# Patient Record
Sex: FEMALE
[De-identification: names, ages, dates, MRNs, and addresses within clinical notes are randomized per-mention and may not be internally consistent; named-entity substitution may affect disease eponyms.]

---

## 2023-10-18 ENCOUNTER — DASHBOARD ENCOUNTERS (OUTPATIENT)
Age: 28
End: 2023-10-18

## 2023-10-18 ENCOUNTER — OFFICE VISIT (OUTPATIENT)
Dept: URBAN - METROPOLITAN AREA CLINIC 115 | Facility: CLINIC | Age: 28
End: 2023-10-18
Payer: COMMERCIAL

## 2023-10-18 ENCOUNTER — LAB OUTSIDE AN ENCOUNTER (OUTPATIENT)
Dept: URBAN - METROPOLITAN AREA CLINIC 115 | Facility: CLINIC | Age: 28
End: 2023-10-18

## 2023-10-18 VITALS
BODY MASS INDEX: 33.63 KG/M2 | TEMPERATURE: 97.8 F | WEIGHT: 197 LBS | HEART RATE: 101 BPM | DIASTOLIC BLOOD PRESSURE: 78 MMHG | HEIGHT: 64 IN | SYSTOLIC BLOOD PRESSURE: 115 MMHG

## 2023-10-18 DIAGNOSIS — K21.9 GASTROESOPHAGEAL REFLUX DISEASE, UNSPECIFIED WHETHER ESOPHAGITIS PRESENT: ICD-10-CM

## 2023-10-18 DIAGNOSIS — R10.13 EPIGASTRIC ABDOMINAL PAIN: ICD-10-CM

## 2023-10-18 DIAGNOSIS — R11.0 NAUSEA: ICD-10-CM

## 2023-10-18 PROBLEM — 422587007: Status: ACTIVE | Noted: 2023-10-18

## 2023-10-18 PROBLEM — 235595009: Status: ACTIVE | Noted: 2023-10-18

## 2023-10-18 PROCEDURE — 99204 OFFICE O/P NEW MOD 45 MIN: CPT | Performed by: INTERNAL MEDICINE

## 2023-10-18 RX ORDER — DICYCLOMINE HYDROCHLORIDE 10 MG/1
TAKE 1 CAPSULE BY MOUTH THREE TIMES A DAY CAPSULE ORAL
Qty: 90 CAPSULE | Refills: 1 | OUTPATIENT
Start: 2023-10-18 | End: 2023-12-16

## 2023-10-18 RX ORDER — PANTOPRAZOLE SODIUM 40 MG/1
1 TABLET TABLET, DELAYED RELEASE ORAL ONCE A DAY
Qty: 30 TABLET | Refills: 1 | OUTPATIENT
Start: 2023-10-18

## 2023-10-18 RX ORDER — SUCRALFATE 1 G/10ML
10 ML 1 HOUR BEFORE MEALS AND AT BEDTIME ON AN EMPTY STOMACH SUSPENSION ORAL
Status: ACTIVE | COMMUNITY

## 2023-10-18 NOTE — HPI-TODAY'S VISIT:
Ms. Jaeger is a 28-year-old female came into the office with complaints of having epigastric abdominal pain as well as nausea and intermittent loose stools and cramping.  Patient stated she has had the symptoms going on off and on for several years she went to the emergency room at least on 3-4 different occasions she reports getting treated with GI cocktails does not remember having any other work-up done.  Patient reports having occasional reflux symptoms denies any anxiety.  She reports that during these episodes of nausea epigastric pain is predominant and sometimes radiates to the periumbilical region.  During this time she also have loose stools.  Her sister had gallbladder disease.  No family history of inflammatory bowel disease.  Her mother accompanied her and endorses a history.  Denies using any NSAIDs.

## 2023-10-19 ENCOUNTER — OFFICE VISIT (OUTPATIENT)
Dept: URBAN - METROPOLITAN AREA CLINIC 114 | Facility: CLINIC | Age: 28
End: 2023-10-19
Payer: COMMERCIAL

## 2023-10-19 DIAGNOSIS — K80.20 GALLSTONE: ICD-10-CM

## 2023-10-19 LAB
A/G RATIO: 1.3
ABSOLUTE BASOPHILS: 17
ABSOLUTE EOSINOPHILS: 92
ABSOLUTE LYMPHOCYTES: 3965
ABSOLUTE MONOCYTES: 571
ABSOLUTE NEUTROPHILS: 3755
ALBUMIN: 4
ALKALINE PHOSPHATASE: 94
ALT (SGPT): 15
AST (SGOT): 13
BASOPHILS: 0.2
BILIRUBIN, TOTAL: 0.2
BUN/CREATININE RATIO: (no result)
BUN: 10
CALCIUM: 9.2
CARBON DIOXIDE, TOTAL: 26
CHLORIDE: 105
CREATININE: 0.74
EGFR: 113
EOSINOPHILS: 1.1
GLOBULIN, TOTAL: 3.2
GLUCOSE: 75
HEMATOCRIT: 38.8
HEMOGLOBIN: 12.5
LIPASE: 43
LYMPHOCYTES: 47.2
MCH: 25.5
MCHC: 32.2
MCV: 79
MONOCYTES: 6.8
MPV: 10.9
NEUTROPHILS: 44.7
PLATELET COUNT: 279
POTASSIUM: 3.8
PROTEIN, TOTAL: 7.2
RDW: 13.1
RED BLOOD CELL COUNT: 4.91
SODIUM: 139
WHITE BLOOD CELL COUNT: 8.4

## 2023-10-19 PROCEDURE — 76705 ECHO EXAM OF ABDOMEN: CPT | Performed by: INTERNAL MEDICINE

## 2023-10-20 ENCOUNTER — TELEPHONE ENCOUNTER (OUTPATIENT)
Dept: URBAN - METROPOLITAN AREA CLINIC 115 | Facility: CLINIC | Age: 28
End: 2023-10-20